# Patient Record
Sex: MALE | Race: BLACK OR AFRICAN AMERICAN | NOT HISPANIC OR LATINO | URBAN - METROPOLITAN AREA
[De-identification: names, ages, dates, MRNs, and addresses within clinical notes are randomized per-mention and may not be internally consistent; named-entity substitution may affect disease eponyms.]

---

## 2018-11-30 ENCOUNTER — EMERGENCY (EMERGENCY)
Facility: HOSPITAL | Age: 43
LOS: 1 days | Discharge: HOME | End: 2018-11-30
Attending: EMERGENCY MEDICINE | Admitting: EMERGENCY MEDICINE
Payer: SELF-PAY

## 2018-11-30 VITALS
DIASTOLIC BLOOD PRESSURE: 93 MMHG | RESPIRATION RATE: 16 BRPM | HEART RATE: 102 BPM | SYSTOLIC BLOOD PRESSURE: 154 MMHG | OXYGEN SATURATION: 98 % | TEMPERATURE: 99 F

## 2018-11-30 VITALS
HEART RATE: 100 BPM | SYSTOLIC BLOOD PRESSURE: 145 MMHG | RESPIRATION RATE: 17 BRPM | OXYGEN SATURATION: 99 % | TEMPERATURE: 98 F | DIASTOLIC BLOOD PRESSURE: 80 MMHG

## 2018-11-30 PROCEDURE — 99053 MED SERV 10PM-8AM 24 HR FAC: CPT

## 2018-11-30 PROCEDURE — 99283 EMERGENCY DEPT VISIT LOW MDM: CPT | Mod: 25

## 2018-11-30 RX ORDER — CIPROFLOXACIN AND DEXAMETHASONE 3; 1 MG/ML; MG/ML
4 SUSPENSION/ DROPS AURICULAR (OTIC)
Qty: 10 | Refills: 0 | OUTPATIENT
Start: 2018-11-30 | End: 2018-12-06

## 2018-11-30 RX ORDER — CIPROFLOXACIN AND DEXAMETHASONE 3; 1 MG/ML; MG/ML
4 SUSPENSION/ DROPS AURICULAR (OTIC) ONCE
Qty: 0 | Refills: 0 | Status: COMPLETED | OUTPATIENT
Start: 2018-11-30 | End: 2018-11-30

## 2018-11-30 RX ADMIN — CIPROFLOXACIN AND DEXAMETHASONE 4 DROP(S): 3; 1 SUSPENSION/ DROPS AURICULAR (OTIC) at 04:38

## 2018-11-30 NOTE — ED PROVIDER NOTE - MEDICAL DECISION MAKING DETAILS
43 year-old male w/ no pertinent past medical history presents to the ED for b/l ear fullness/pressure likely secondary to otitis externa; unable to assess TM for otitis media but given fever and occasional sinus pressure plan to treat with oral antibiotics.  No evidence of mastoid TTP and does not appear systemically ill.

## 2018-11-30 NOTE — ED PROVIDER NOTE - PHYSICAL EXAMINATION
*Gen: NAD, AAO*3  *HEENT: NC/AT, swelling/irritation with exudate noted on external ear exam - TM unable to be visualized, MMM, airway patent, trachea midline  *CV: RRR, S1/S2 present, no murmurs/rubs/gallops  *Resp: no respiratory distress, LCTAB, no wheezing/rales/rhonchi  *Abd: non-distended, soft N/Tx4, no guarding or rigidity  *Neuro: no focal neuro deficits, moving all limbs appropriately  *Extremities: no gross deformity  *Skin: no rashes, no wounds   ~ Laureano Angel M.D.

## 2018-11-30 NOTE — ED ADULT NURSE NOTE - OBJECTIVE STATEMENT
Break Coverage RICK Marie: Patient is a 42 y/o male a&ox4 p/w a c/c of BL ear "fullness," that began this past Friday.  Patient denies drainage or discharge from ears, denies cough, denies runny nose, SOB, CP, N/V/D, F/C, abd pain, GI/ symptoms.  Patient in nad, respirations unlabored, waiting to be seen by MD.

## 2018-11-30 NOTE — ED ADULT TRIAGE NOTE - CHIEF COMPLAINT QUOTE
pt c/o bilateral ear fullness since flying home from [place] on friday, denies fevers/dizziness/congestion/cough, no pmh. comfortable in triage.

## 2018-11-30 NOTE — ED PROVIDER NOTE - OBJECTIVE STATEMENT
43 year-old male w/ no pertinent past medical history presents to the ED for b/l ear fullness/pressure ongoing for the past week.  No fevers, chills, nausea, vomiting, tinnitus, vision changes, dizziness, lightheadedness.

## 2018-11-30 NOTE — ED ADULT NURSE NOTE - CHIEF COMPLAINT QUOTE
pt c/o bilateral ear fullness since flying home from [place] on friday, denies fevers/dizziness/congestion/cough, no pmh. comfortable in triage.

## 2018-11-30 NOTE — ED PROVIDER NOTE - ATTENDING CONTRIBUTION TO CARE
HPI: 42 yo M with no known Past Medical History that presents with bilateral ear fullness and pressure x 5 days, visiting from [place] and thought it was the flying that caused it but having worsening pain and feeling hot with subjective fevers so came for eval. no trauma, no swimming, denies Qtip use recently but has in past, denies any other FB. No N/V/D, vision changes, chest pain, SOB. Minimal decreased in hearing. No DM history. Non smoker.   EXAM: Bilateral TMs not seen but yellow discharge with swelling in both canals. No pain at mastoid or tragus.   MDM: Concern for Otitis Externa, possible otitis media. Will treat with ciprodex and augmentin and f/u with ENT and PMD. Return for worsening pain and symptoms. Educated pt on how to use Ciprodex with era sami which were provided to pt.

## 2022-03-18 NOTE — ED ADULT NURSE NOTE - NSFALLRSKASSESSDT_ED_ALL_ED
Pt notified of the refer being faxed to [facility] at Fax: [phone]
30-Nov-2018 02:50